# Patient Record
Sex: FEMALE | Race: WHITE | NOT HISPANIC OR LATINO
[De-identification: names, ages, dates, MRNs, and addresses within clinical notes are randomized per-mention and may not be internally consistent; named-entity substitution may affect disease eponyms.]

---
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
## 2024-05-06 NOTE — DISCUSSION/SUMMARY
received pt from RN. pt returned from xray. resting comfortably on chair. respirations equal and unlabored, in NAD [Normal Growth] : growth [Normal Development] : development  [No Elimination Concerns] : elimination [Continue Regimen] : feeding [No Skin Concerns] : skin [Normal Sleep Pattern] : sleep [None] : no medical problems [Anticipatory Guidance Given] : Anticipatory guidance addressed as per the history of present illness section [Physical Growth and Development] : physical growth and development [Social and Academic Competence] : social and academic competence [Emotional Well-Being] : emotional well-being [Risk Reduction] : risk reduction [Violence and Injury Prevention] : violence and injury prevention [No Vaccines] : no vaccines needed [No Medications] : ~He/She~ is not on any medications [Patient] : patient [Parent/Guardian] : Parent/Guardian [FreeTextEntry1] : Continue balanced diet with all food groups. Brush teeth twice a day with toothbrush. Recommend visit to dentist. Maintain consistent daily routines and sleep schedule. Personal hygiene, puberty, and sexual health reviewed. Risky behaviors assessed. School discussed. Limit screen time to no more than 2 hours per day. Encourage physical activity.\par Return 1 year for routine well child check.\par I recommended that the patient participates in 60 minutes or more of physical activity a day.  As an older child, I encouraged structured physical activity when possible (ie, participation in team or individual sports, or supervised exercise sessions). I explained that the patient would be more likely to participate consistently in these activities because they would be accountable to a  or leader. I also suggested engaging in a gym or fitness center if possible.  Educational material relating to physical activity was provided to the patient.\par \par